# Patient Record
Sex: MALE | Race: WHITE
[De-identification: names, ages, dates, MRNs, and addresses within clinical notes are randomized per-mention and may not be internally consistent; named-entity substitution may affect disease eponyms.]

---

## 2021-01-04 ENCOUNTER — HOSPITAL ENCOUNTER (OUTPATIENT)
Dept: HOSPITAL 47 - LABWHC1 | Age: 71
Discharge: HOME | End: 2021-01-04
Attending: OPHTHALMOLOGY
Payer: MEDICARE

## 2021-01-04 DIAGNOSIS — H02.832: ICD-10-CM

## 2021-01-04 DIAGNOSIS — Z01.818: Primary | ICD-10-CM

## 2021-11-01 ENCOUNTER — HOSPITAL ENCOUNTER (OUTPATIENT)
Dept: HOSPITAL 47 - ORWHC2ENDO | Age: 71
Discharge: HOME | End: 2021-11-01
Attending: SURGERY
Payer: MEDICARE

## 2021-11-01 VITALS — BODY MASS INDEX: 31.1 KG/M2

## 2021-11-01 VITALS — HEART RATE: 70 BPM | DIASTOLIC BLOOD PRESSURE: 76 MMHG | SYSTOLIC BLOOD PRESSURE: 127 MMHG

## 2021-11-01 VITALS — TEMPERATURE: 97.7 F

## 2021-11-01 VITALS — RESPIRATION RATE: 16 BRPM

## 2021-11-01 DIAGNOSIS — E78.5: ICD-10-CM

## 2021-11-01 DIAGNOSIS — Z12.11: Primary | ICD-10-CM

## 2021-11-01 RX ADMIN — POTASSIUM CHLORIDE SCH MLS: 14.9 INJECTION, SOLUTION INTRAVENOUS at 14:26

## 2021-11-01 RX ADMIN — POTASSIUM CHLORIDE SCH MLS: 14.9 INJECTION, SOLUTION INTRAVENOUS at 15:18

## 2021-11-01 NOTE — P.OP
Date of Procedure: 11/01/21


Preoperative Diagnosis: 


Screening colonoscopy


Postoperative Diagnosis: 


Mild diverticulosis


Procedure(s) Performed: 


Colonoscopy


Anesthesia: MAC


Surgeon: Tr Del Castillo


Pathology: none sent


Condition: stable


Disposition: PACU


Description of Procedure: 


The patient's placed on the endoscopy table in the lateral position.  C IV 

sedation.  Digital rectal exam was performed which revealed no ebonized.  

Flexible colonoscope was then placed patient anus passed throughout the entire 

colon.  The ileocecal valve was visualized.  The cecum, ascending and transverse

appeared normal.  In the descending; was mild diverticulosis.  Scope was brought

back the rectum and this appeared normal.  Scope withdrawn for patient.

## 2021-11-01 NOTE — P.GSHP
History of Present Illness


H&P Date: 11/01/21


Chief Complaint: Screening colonoscopy


This a 71-year-old male who presents today for screening colonoscopy.  Patient 

denies a significant complaints.








Past Medical History


Past Medical History: Hyperlipidemia, Prostate Disorder


History of Any Multi-Drug Resistant Organisms: None Reported


Past Surgical History: Orthopedic Surgery


Additional Past Surgical History / Comment(s): LT KNEE SX.  RT KNEE SCOPE.  

COLONOSCOPY


Past Anesthesia/Blood Transfusion Reactions: No Reported Reaction


Smoking Status: Never smoker





- Past Family History


  ** Mother


Family Medical History: No Reported History





Medications and Allergies


                                Home Medications











 Medication  Instructions  Recorded  Confirmed  Type


 


Simvastatin [Zocor] 20 mg PO HS 10/27/21 11/01/21 History


 


Tamsulosin [Flomax] 0.4 mg PO DAILY 10/27/21 11/01/21 History








                                    Allergies











Allergy/AdvReac Type Severity Reaction Status Date / Time


 


No Known Allergies Allergy   Verified 11/01/21 14:00














Surgical - Exam


                                   Vital Signs











Temp Pulse Resp BP Pulse Ox


 


 97.7 F   88   18   158/80   96 


 


 11/01/21 14:09  11/01/21 14:09  11/01/21 14:09  11/01/21 14:09  11/01/21 14:09














- General


well developed, well nourished, no distress





- Eyes


PERRL





- ENT


normal pinna





- Neck


no masses





- Respiratory


normal expansion





- Cardiovascular


Rhythm: regular





- Abdomen


Abdomen: soft, non tender





Assessment and Plan


Assessment: 





We'll perform screening colonoscopy